# Patient Record
Sex: FEMALE | Race: BLACK OR AFRICAN AMERICAN | NOT HISPANIC OR LATINO
[De-identification: names, ages, dates, MRNs, and addresses within clinical notes are randomized per-mention and may not be internally consistent; named-entity substitution may affect disease eponyms.]

---

## 2021-03-17 ENCOUNTER — TRANSCRIPTION ENCOUNTER (OUTPATIENT)
Age: 61
End: 2021-03-17

## 2021-04-04 ENCOUNTER — EMERGENCY (EMERGENCY)
Facility: HOSPITAL | Age: 61
LOS: 0 days | Discharge: HOME | End: 2021-04-04
Attending: STUDENT IN AN ORGANIZED HEALTH CARE EDUCATION/TRAINING PROGRAM
Payer: COMMERCIAL

## 2021-04-04 VITALS
WEIGHT: 205.03 LBS | RESPIRATION RATE: 19 BRPM | DIASTOLIC BLOOD PRESSURE: 85 MMHG | HEIGHT: 67 IN | SYSTOLIC BLOOD PRESSURE: 193 MMHG | TEMPERATURE: 98 F | OXYGEN SATURATION: 100 % | HEART RATE: 83 BPM

## 2021-04-04 VITALS
RESPIRATION RATE: 18 BRPM | SYSTOLIC BLOOD PRESSURE: 147 MMHG | OXYGEN SATURATION: 99 % | DIASTOLIC BLOOD PRESSURE: 60 MMHG | HEART RATE: 75 BPM

## 2021-04-04 DIAGNOSIS — E78.5 HYPERLIPIDEMIA, UNSPECIFIED: ICD-10-CM

## 2021-04-04 DIAGNOSIS — I10 ESSENTIAL (PRIMARY) HYPERTENSION: ICD-10-CM

## 2021-04-04 DIAGNOSIS — R68.84 JAW PAIN: ICD-10-CM

## 2021-04-04 DIAGNOSIS — R94.31 ABNORMAL ELECTROCARDIOGRAM [ECG] [EKG]: ICD-10-CM

## 2021-04-04 DIAGNOSIS — Z91.041 RADIOGRAPHIC DYE ALLERGY STATUS: ICD-10-CM

## 2021-04-04 DIAGNOSIS — R51.9 HEADACHE, UNSPECIFIED: ICD-10-CM

## 2021-04-04 LAB
ALBUMIN SERPL ELPH-MCNC: 4.1 G/DL — SIGNIFICANT CHANGE UP (ref 3.3–5)
ALP SERPL-CCNC: 69 U/L — SIGNIFICANT CHANGE UP (ref 40–120)
ALT FLD-CCNC: 24 U/L — SIGNIFICANT CHANGE UP (ref 12–78)
ANION GAP SERPL CALC-SCNC: 6 MMOL/L — SIGNIFICANT CHANGE UP (ref 5–17)
APTT BLD: 32.4 SEC — SIGNIFICANT CHANGE UP (ref 27.5–35.5)
AST SERPL-CCNC: 21 U/L — SIGNIFICANT CHANGE UP (ref 15–37)
BASOPHILS # BLD AUTO: 0.01 K/UL — SIGNIFICANT CHANGE UP (ref 0–0.2)
BASOPHILS NFR BLD AUTO: 0.3 % — SIGNIFICANT CHANGE UP (ref 0–2)
BILIRUB SERPL-MCNC: 0.3 MG/DL — SIGNIFICANT CHANGE UP (ref 0.2–1.2)
BUN SERPL-MCNC: 12 MG/DL — SIGNIFICANT CHANGE UP (ref 7–23)
CALCIUM SERPL-MCNC: 9.5 MG/DL — SIGNIFICANT CHANGE UP (ref 8.5–10.1)
CHLORIDE SERPL-SCNC: 110 MMOL/L — HIGH (ref 96–108)
CO2 SERPL-SCNC: 27 MMOL/L — SIGNIFICANT CHANGE UP (ref 22–31)
CREAT SERPL-MCNC: 0.83 MG/DL — SIGNIFICANT CHANGE UP (ref 0.5–1.3)
EOSINOPHIL # BLD AUTO: 0.01 K/UL — SIGNIFICANT CHANGE UP (ref 0–0.5)
EOSINOPHIL NFR BLD AUTO: 0.3 % — SIGNIFICANT CHANGE UP (ref 0–6)
GLUCOSE SERPL-MCNC: 93 MG/DL — SIGNIFICANT CHANGE UP (ref 70–99)
HCT VFR BLD CALC: 39.9 % — SIGNIFICANT CHANGE UP (ref 34.5–45)
HGB BLD-MCNC: 13.6 G/DL — SIGNIFICANT CHANGE UP (ref 11.5–15.5)
IMM GRANULOCYTES NFR BLD AUTO: 0 % — SIGNIFICANT CHANGE UP (ref 0–1.5)
INR BLD: 0.93 RATIO — SIGNIFICANT CHANGE UP (ref 0.88–1.16)
LYMPHOCYTES # BLD AUTO: 1.98 K/UL — SIGNIFICANT CHANGE UP (ref 1–3.3)
LYMPHOCYTES # BLD AUTO: 50.8 % — HIGH (ref 13–44)
MCHC RBC-ENTMCNC: 27.9 PG — SIGNIFICANT CHANGE UP (ref 27–34)
MCHC RBC-ENTMCNC: 34.1 GM/DL — SIGNIFICANT CHANGE UP (ref 32–36)
MCV RBC AUTO: 81.8 FL — SIGNIFICANT CHANGE UP (ref 80–100)
MONOCYTES # BLD AUTO: 0.26 K/UL — SIGNIFICANT CHANGE UP (ref 0–0.9)
MONOCYTES NFR BLD AUTO: 6.7 % — SIGNIFICANT CHANGE UP (ref 2–14)
NEUTROPHILS # BLD AUTO: 1.64 K/UL — LOW (ref 1.8–7.4)
NEUTROPHILS NFR BLD AUTO: 41.9 % — LOW (ref 43–77)
NRBC # BLD: 0 /100 WBCS — SIGNIFICANT CHANGE UP (ref 0–0)
NT-PROBNP SERPL-SCNC: 65 PG/ML — SIGNIFICANT CHANGE UP (ref 0–125)
PLATELET # BLD AUTO: 158 K/UL — SIGNIFICANT CHANGE UP (ref 150–400)
POTASSIUM SERPL-MCNC: 4.5 MMOL/L — SIGNIFICANT CHANGE UP (ref 3.5–5.3)
POTASSIUM SERPL-SCNC: 4.5 MMOL/L — SIGNIFICANT CHANGE UP (ref 3.5–5.3)
PROT SERPL-MCNC: 8 GM/DL — SIGNIFICANT CHANGE UP (ref 6–8.3)
PROTHROM AB SERPL-ACNC: 10.8 SEC — SIGNIFICANT CHANGE UP (ref 10.6–13.6)
RBC # BLD: 4.88 M/UL — SIGNIFICANT CHANGE UP (ref 3.8–5.2)
RBC # FLD: 15.1 % — HIGH (ref 10.3–14.5)
SODIUM SERPL-SCNC: 143 MMOL/L — SIGNIFICANT CHANGE UP (ref 135–145)
TROPONIN I SERPL-MCNC: <.015 NG/ML — SIGNIFICANT CHANGE UP (ref 0.01–0.04)
TROPONIN I SERPL-MCNC: <.015 NG/ML — SIGNIFICANT CHANGE UP (ref 0.01–0.04)
WBC # BLD: 3.9 K/UL — SIGNIFICANT CHANGE UP (ref 3.8–10.5)
WBC # FLD AUTO: 3.9 K/UL — SIGNIFICANT CHANGE UP (ref 3.8–10.5)

## 2021-04-04 PROCEDURE — 70450 CT HEAD/BRAIN W/O DYE: CPT | Mod: 26,MC

## 2021-04-04 PROCEDURE — 99285 EMERGENCY DEPT VISIT HI MDM: CPT

## 2021-04-04 PROCEDURE — 71045 X-RAY EXAM CHEST 1 VIEW: CPT | Mod: 26

## 2021-04-04 PROCEDURE — 93010 ELECTROCARDIOGRAM REPORT: CPT

## 2021-04-04 RX ORDER — ASPIRIN/CALCIUM CARB/MAGNESIUM 324 MG
325 TABLET ORAL ONCE
Refills: 0 | Status: COMPLETED | OUTPATIENT
Start: 2021-04-04 | End: 2021-04-04

## 2021-04-04 RX ORDER — ACETAMINOPHEN 500 MG
650 TABLET ORAL ONCE
Refills: 0 | Status: COMPLETED | OUTPATIENT
Start: 2021-04-04 | End: 2021-04-04

## 2021-04-04 RX ORDER — SODIUM CHLORIDE 9 MG/ML
1000 INJECTION INTRAMUSCULAR; INTRAVENOUS; SUBCUTANEOUS ONCE
Refills: 0 | Status: COMPLETED | OUTPATIENT
Start: 2021-04-04 | End: 2021-04-04

## 2021-04-04 RX ORDER — HYDRALAZINE HCL 50 MG
10 TABLET ORAL ONCE
Refills: 0 | Status: COMPLETED | OUTPATIENT
Start: 2021-04-04 | End: 2021-04-04

## 2021-04-04 RX ADMIN — Medication 10 MILLIGRAM(S): at 13:24

## 2021-04-04 RX ADMIN — SODIUM CHLORIDE 1000 MILLILITER(S): 9 INJECTION INTRAMUSCULAR; INTRAVENOUS; SUBCUTANEOUS at 14:37

## 2021-04-04 RX ADMIN — SODIUM CHLORIDE 1000 MILLILITER(S): 9 INJECTION INTRAMUSCULAR; INTRAVENOUS; SUBCUTANEOUS at 15:21

## 2021-04-04 RX ADMIN — Medication 325 MILLIGRAM(S): at 13:10

## 2021-04-04 NOTE — ED ADULT NURSE NOTE - OBJECTIVE STATEMENT
Patient is alert and oriented x4. Patient complains of left sided headache that radiates to left jaw 6/10 that started yesterday PM. Patient has a history of high cholesterol and hypertension. States she took her Amlodipine 5 mg this AM.

## 2021-04-04 NOTE — ED PROVIDER NOTE - PROGRESS NOTE DETAILS
BP improved to 140/90 s/p hydralazine. Resting comfortably. Pt reports continued headache. Tylenol, CT brain ordered. BP decreased to 80/60. Pt reports dizziness. Will give IVF. Rpt /60. Dizziness, headache are resolved. Resting comfortably. Delta trop negative. CT brain WNL.

## 2021-04-04 NOTE — ED PROVIDER NOTE - CLINICAL SUMMARY MEDICAL DECISION MAKING FREE TEXT BOX
59yo F w/ PMH HTN, HLD pw 2 days L jaw pain a/w elevated blood pressures. AFVSS other than elevated BP (200/100) on ED arrival. Plan: Treat BP, headache, obtain ECG, trop x2, CBC, CMP, CXR, coags, CT brain. Re-eval. W/u w/o significant abnormalities. HEART score 3 low risk. On re-eval, symptoms resolved. Stable for d/c. Instructed for close outpatient f/u w/ Cardio, PCP. Return signs and symptoms d/w pt. She understands and agrees w/ this plan.

## 2021-04-04 NOTE — ED PROVIDER NOTE - OBJECTIVE STATEMENT
61yo F w/ PMH HTN, HLD pw elevated BPs (190/105), a/w L jaw pain x 2 days. Pt reports initially L facial pain w/ straining BM, but pain persisted, today moved into L jaw / anterior lateral neck. Concerned and so comes to ED. Denies hx MI, no outpatient Cardio. PCP (Dr Hanna) recently changed BP med to amlodipine 1.5wk ago. ROS otherwise neg: Denies F/C, CP, SOB, cough, abd pain, back pain, N/V/D, UTI sx.     PMH as above, PSH crestor, amlodipine, NKDA, PSH  x 2 59yo F w/ PMH HTN, HLD pw elevated BPs (190/105), a/w L jaw pain x 2 days. Pt reports initially L facial pain w/ straining BM, but pain persisted, today moved into L jaw / anterior lateral neck. Concerned and so comes to ED. Denies hx MI, no outpatient Cardio. PCP (Dr Godwin) recently changed BP med to amlodipine 1.5wk ago. ROS otherwise neg: Denies F/C, CP, SOB, cough, abd pain, back pain, N/V/D, UTI sx.     PMH as above, PSH crestor, amlodipine, NKDA, PSH  x 2

## 2021-04-04 NOTE — ED ADULT NURSE NOTE - NSIMPLEMENTINTERV_GEN_ALL_ED
Implemented All Universal Safety Interventions:  [place] to call system. Call bell, personal items and telephone within reach. Instruct patient to call for assistance. Room bathroom lighting operational. Non-slip footwear when patient is off stretcher. Physically safe environment: no spills, clutter or unnecessary equipment. Stretcher in lowest position, wheels locked, appropriate side rails in place.

## 2021-04-04 NOTE — ED PROVIDER NOTE - CARE PROVIDER_API CALL
Rohan Huerta)  Cardiology; Cardiovascular Disease; Nuclear Cardiology  [address] [place] [address]  Phone: [phone]  Fax: [phone]  Follow Up Time: 7-10 Days

## 2021-04-04 NOTE — ED PROVIDER NOTE - PATIENT PORTAL LINK FT
You can access the FollowMyHealth Patient Portal offered by [facility] by registering at the following website: [email or web address]. By joining "[employer]’s FollowMyHealth portal, you will also be able to view your health information using other applications (apps) compatible with our system.

## 2021-04-05 PROBLEM — E78.00 PURE HYPERCHOLESTEROLEMIA, UNSPECIFIED: Chronic | Status: ACTIVE | Noted: 2021-04-04

## 2021-04-05 PROBLEM — I10 ESSENTIAL (PRIMARY) HYPERTENSION: Chronic | Status: ACTIVE | Noted: 2021-04-04

## 2021-04-16 ENCOUNTER — NON-APPOINTMENT (OUTPATIENT)
Age: 61
End: 2021-04-16

## 2021-04-16 ENCOUNTER — APPOINTMENT (OUTPATIENT)
Dept: CARDIOLOGY | Facility: CLINIC | Age: 61
End: 2021-04-16
Payer: COMMERCIAL

## 2021-04-16 VITALS
DIASTOLIC BLOOD PRESSURE: 80 MMHG | TEMPERATURE: 97.5 F | SYSTOLIC BLOOD PRESSURE: 170 MMHG | WEIGHT: 204 LBS | HEIGHT: 67 IN | BODY MASS INDEX: 32.02 KG/M2 | OXYGEN SATURATION: 99 % | HEART RATE: 84 BPM

## 2021-04-16 VITALS — DIASTOLIC BLOOD PRESSURE: 80 MMHG | SYSTOLIC BLOOD PRESSURE: 162 MMHG

## 2021-04-16 DIAGNOSIS — R94.31 ABNORMAL ELECTROCARDIOGRAM [ECG] [EKG]: ICD-10-CM

## 2021-04-16 PROCEDURE — 99072 ADDL SUPL MATRL&STAF TM PHE: CPT

## 2021-04-16 PROCEDURE — 99204 OFFICE O/P NEW MOD 45 MIN: CPT

## 2021-04-16 PROCEDURE — 93000 ELECTROCARDIOGRAM COMPLETE: CPT

## 2021-04-16 RX ORDER — AMLODIPINE BESYLATE 10 MG/1
10 TABLET ORAL
Qty: 90 | Refills: 2 | Status: DISCONTINUED | COMMUNITY
End: 2021-04-16

## 2021-04-16 RX ORDER — AMLODIPINE AND OLMESARTAN MEDOXOMIL 10; 40 MG/1; MG/1
10-40 TABLET ORAL DAILY
Qty: 90 | Refills: 3 | Status: ACTIVE | COMMUNITY
Start: 2021-04-16 | End: 1900-01-01

## 2021-04-16 RX ORDER — ROSUVASTATIN CALCIUM 10 MG/1
10 TABLET, FILM COATED ORAL
Qty: 90 | Refills: 2 | Status: ACTIVE | COMMUNITY

## 2021-04-16 NOTE — PHYSICAL EXAM
[General Appearance - Well Developed] : well developed [Normal Appearance] : normal appearance [Well Groomed] : well groomed [General Appearance - Well Nourished] : well nourished [No Deformities] : no deformities [General Appearance - In No Acute Distress] : no acute distress [Normal Conjunctiva] : the conjunctiva exhibited no abnormalities [Eyelids - No Xanthelasma] : the eyelids demonstrated no xanthelasmas [Normal Oral Mucosa] : normal oral mucosa [No Oral Pallor] : no oral pallor [No Oral Cyanosis] : no oral cyanosis [Normal Jugular Venous A Waves Present] : normal jugular venous A waves present [Normal Jugular Venous V Waves Present] : normal jugular venous V waves present [No Jugular Venous Das A Waves] : no jugular venous das A waves [Heart Rate And Rhythm] : heart rate and rhythm were normal [Heart Sounds] : normal S1 and S2 [Murmurs] : no murmurs present [Respiration, Rhythm And Depth] : normal respiratory rhythm and effort [Exaggerated Use Of Accessory Muscles For Inspiration] : no accessory muscle use [Auscultation Breath Sounds / Voice Sounds] : lungs were clear to auscultation bilaterally [Abdomen Soft] : soft [Abdomen Tenderness] : non-tender [Abdomen Mass (___ Cm)] : no abdominal mass palpated [Abnormal Walk] : normal gait [Gait - Sufficient For Exercise Testing] : the gait was sufficient for exercise testing [Nail Clubbing] : no clubbing of the fingernails [Cyanosis, Localized] : no localized cyanosis [Petechial Hemorrhages (___cm)] : no petechial hemorrhages [Skin Color & Pigmentation] : normal skin color and pigmentation [] : no rash [No Venous Stasis] : no venous stasis [Skin Lesions] : no skin lesions [No Skin Ulcers] : no skin ulcer [No Xanthoma] : no  xanthoma was observed [Oriented To Time, Place, And Person] : oriented to person, place, and time [Affect] : the affect was normal [Mood] : the mood was normal [No Anxiety] : not feeling anxious

## 2021-04-16 NOTE — HISTORY OF PRESENT ILLNESS
[FreeTextEntry1] : 60-year-old female who was seen at [facility] emergency room on 4/4/2021 for complaints of severe headache radiating to left jaw.  Patient has longstanding history of hypertension and hyperlipidemia.  She had been on Surekha until several weeks ago when she was noted to have a cough and the Azo was discontinued (she had been started on Toprol but that too was discontinued because of persistent blood pressure elevations), currently on amlodipine 10 mg.  She was treated for her headache in the emergency room but was noted to have severely elevated blood pressures.  She was given hydralazine and pain medications for headache; her blood pressure actually bottomed out and then she was treated with IV fluid and ultimately when the headache resolved the blood pressure is improved.  She was discharged and told to follow-up with cardiology because of EKG abnormalities that were noted (including inferior and lateral wall flipped T waves).  She denies any prior history of coronary disease, no history of chest pain, diaphoresis, palpitations or syncopal episodes.\par \par When I reviewed her previous EKGs done at her primary care physician's office, they were noted to be normal.  EKG ordered today for follow-up also shows fairly normal EKG with some LVH and first-degree AV block.

## 2021-04-16 NOTE — DISCUSSION/SUMMARY
[___ Week(s)] : [unfilled] week(s) [FreeTextEntry1] : 60-year-old female with somewhat poorly controlled hypertension.  Referred for abnormal EKG.  Patient will be scheduled for risk stratification with exercise nuclear stress testing.\par \par Meanwhile, advised resumption of her Surekha.  Doubt that olmesartan would be the cause of her cough but if her cough recurs we will seek out alternatives.  Patient asked to keep a log of her blood pressures at home when she is feeling well.  We will see her again in 4 to 6 weeks for reevaluation.  She is scheduled to be seen by her PCP next week who will also follow-up with blood pressure measurement.  She states that she had an echocardiogram, will request copies.\par \par Some of her persistent elevated blood pressures may be due to sleep apnea.  Patient does have significant insomnia which has not responded to melatonin or other conservative management in the past.  Consider referral for sleep study.  Also, significant stressors at work may be playing into her current elevated blood pressure; patient sought to seek out help if available at her occupation.

## 2021-04-26 ENCOUNTER — APPOINTMENT (OUTPATIENT)
Dept: CARDIOLOGY | Facility: CLINIC | Age: 61
End: 2021-04-26
Payer: COMMERCIAL

## 2021-04-26 PROCEDURE — 99072 ADDL SUPL MATRL&STAF TM PHE: CPT

## 2021-04-26 PROCEDURE — A9500: CPT

## 2021-04-26 PROCEDURE — 78452 HT MUSCLE IMAGE SPECT MULT: CPT

## 2021-04-26 PROCEDURE — 93015 CV STRESS TEST SUPVJ I&R: CPT

## 2021-06-10 ENCOUNTER — APPOINTMENT (OUTPATIENT)
Dept: CARDIOLOGY | Facility: CLINIC | Age: 61
End: 2021-06-10
Payer: COMMERCIAL

## 2021-06-10 VITALS
BODY MASS INDEX: 31.39 KG/M2 | HEIGHT: 67 IN | SYSTOLIC BLOOD PRESSURE: 150 MMHG | OXYGEN SATURATION: 98 % | HEART RATE: 80 BPM | TEMPERATURE: 97 F | WEIGHT: 200 LBS | DIASTOLIC BLOOD PRESSURE: 84 MMHG

## 2021-06-10 DIAGNOSIS — E78.5 HYPERLIPIDEMIA, UNSPECIFIED: ICD-10-CM

## 2021-06-10 DIAGNOSIS — I10 ESSENTIAL (PRIMARY) HYPERTENSION: ICD-10-CM

## 2021-06-10 PROCEDURE — 93000 ELECTROCARDIOGRAM COMPLETE: CPT

## 2021-06-10 PROCEDURE — 99072 ADDL SUPL MATRL&STAF TM PHE: CPT

## 2021-06-10 PROCEDURE — 99213 OFFICE O/P EST LOW 20 MIN: CPT

## 2021-06-10 PROCEDURE — 93306 TTE W/DOPPLER COMPLETE: CPT

## 2021-06-10 NOTE — HISTORY OF PRESENT ILLNESS
[FreeTextEntry1] : 60-year-old female who was seen at [facility] emergency room on 4/4/2021 for complaints of severe headache radiating to left jaw.  Patient has longstanding history of hypertension and hyperlipidemia.  She had been on Surekha until several weeks ago when she was noted to have a cough and the Azo was discontinued (she had been started on Toprol but that too was discontinued because of persistent blood pressure elevations), currently on amlodipine 10 mg.  She was treated for her headache in the emergency room but was noted to have severely elevated blood pressures.  She was given hydralazine and pain medications for headache; her blood pressure actually bottomed out and then she was treated with IV fluid and ultimately when the headache resolved the blood pressure is improved.  She was discharged and told to follow-up with cardiology because of EKG abnormalities that were noted (including inferior and lateral wall flipped T waves).  She denies any prior history of coronary disease, no history of chest pain, diaphoresis, palpitations or syncopal episodes.\par \par Stress test done was unremarkable.

## 2021-06-10 NOTE — DISCUSSION/SUMMARY
[___ Week(s)] : in [unfilled] week(s) [FreeTextEntry1] : 60-year-old female with somewhat poorly controlled hypertension.  \par \par Patient "log of her self measured blood pressures which, interestingly enough, show absolutely normal blood pressures on the weekends and elevated blood pressures at work.  Patient agrees that there is probably a link between significant stressors at work and her blood pressures.  Will obtain echocardiogram to assess for end-organ damage but advised patient seek stress reducing alternatives at her current employment (job counseling?Change of jobs?)\par \par Suggest referral for sleep apnea assessment.  Patient does have significant insomnia which has not responded to melatonin or other conservative management in the past.

## 2021-06-10 NOTE — CARDIOLOGY SUMMARY
[de-identified] : 4/16/21, Sinus  Rhythm  -First degree A-V block  Voltage criteria for LVH  (R(I)+S(III) exceeds 2.50 mV)  -Voltage criteria w/o ST/T abnormality may be normal.   -consider old anterior infarct.  [de-identified] : 4/26/21, Normal exercise SPECT

## 2021-06-29 PROBLEM — E78.5 HYPERLIPIDEMIA, MILD: Status: ACTIVE | Noted: 2021-04-16

## 2021-06-29 PROBLEM — I10 BENIGN SYSTOLIC HYPERTENSION: Status: ACTIVE | Noted: 2021-04-16

## 2021-10-05 ENCOUNTER — APPOINTMENT (OUTPATIENT)
Dept: CARDIOLOGY | Facility: CLINIC | Age: 61
End: 2021-10-05

## 2022-02-09 ENCOUNTER — INPATIENT (INPATIENT)
Facility: HOSPITAL | Age: 62
LOS: 0 days | Discharge: HOME | End: 2022-02-10
Attending: STUDENT IN AN ORGANIZED HEALTH CARE EDUCATION/TRAINING PROGRAM | Admitting: STUDENT IN AN ORGANIZED HEALTH CARE EDUCATION/TRAINING PROGRAM
Payer: COMMERCIAL

## 2022-02-09 VITALS
HEIGHT: 67 IN | TEMPERATURE: 98 F | HEART RATE: 82 BPM | SYSTOLIC BLOOD PRESSURE: 140 MMHG | RESPIRATION RATE: 19 BRPM | WEIGHT: 199.96 LBS | OXYGEN SATURATION: 100 % | DIASTOLIC BLOOD PRESSURE: 77 MMHG

## 2022-02-09 LAB
ALBUMIN SERPL ELPH-MCNC: 3.1 G/DL — LOW (ref 3.3–5)
ALP SERPL-CCNC: 56 U/L — SIGNIFICANT CHANGE UP (ref 40–120)
ALT FLD-CCNC: 70 U/L — SIGNIFICANT CHANGE UP (ref 12–78)
ANION GAP SERPL CALC-SCNC: 7 MMOL/L — SIGNIFICANT CHANGE UP (ref 5–17)
APPEARANCE UR: CLEAR — SIGNIFICANT CHANGE UP
APTT BLD: 27.7 SEC — SIGNIFICANT CHANGE UP (ref 27.5–35.5)
AST SERPL-CCNC: 50 U/L — HIGH (ref 15–37)
BACTERIA # UR AUTO: ABNORMAL
BASOPHILS # BLD AUTO: 0.01 K/UL — SIGNIFICANT CHANGE UP (ref 0–0.2)
BASOPHILS NFR BLD AUTO: 0.2 % — SIGNIFICANT CHANGE UP (ref 0–2)
BILIRUB SERPL-MCNC: 0.5 MG/DL — SIGNIFICANT CHANGE UP (ref 0.2–1.2)
BILIRUB UR-MCNC: NEGATIVE — SIGNIFICANT CHANGE UP
BUN SERPL-MCNC: 17 MG/DL — SIGNIFICANT CHANGE UP (ref 7–23)
CALCIUM SERPL-MCNC: 9.4 MG/DL — SIGNIFICANT CHANGE UP (ref 8.5–10.1)
CHLORIDE SERPL-SCNC: 106 MMOL/L — SIGNIFICANT CHANGE UP (ref 96–108)
CO2 SERPL-SCNC: 26 MMOL/L — SIGNIFICANT CHANGE UP (ref 22–31)
COLOR SPEC: YELLOW — SIGNIFICANT CHANGE UP
CREAT SERPL-MCNC: 1.04 MG/DL — SIGNIFICANT CHANGE UP (ref 0.5–1.3)
D DIMER BLD IA.RAPID-MCNC: 363 NG/ML DDU — HIGH
DIFF PNL FLD: NEGATIVE — SIGNIFICANT CHANGE UP
EOSINOPHIL # BLD AUTO: 0 K/UL — SIGNIFICANT CHANGE UP (ref 0–0.5)
EOSINOPHIL NFR BLD AUTO: 0 % — SIGNIFICANT CHANGE UP (ref 0–6)
EPI CELLS # UR: SIGNIFICANT CHANGE UP
FLUAV AG NPH QL: SIGNIFICANT CHANGE UP
FLUBV AG NPH QL: SIGNIFICANT CHANGE UP
GLUCOSE SERPL-MCNC: 94 MG/DL — SIGNIFICANT CHANGE UP (ref 70–99)
GLUCOSE UR QL: NEGATIVE MG/DL — SIGNIFICANT CHANGE UP
HCT VFR BLD CALC: 34.2 % — LOW (ref 34.5–45)
HGB BLD-MCNC: 11.3 G/DL — LOW (ref 11.5–15.5)
IMM GRANULOCYTES NFR BLD AUTO: 1.1 % — SIGNIFICANT CHANGE UP (ref 0–1.5)
INR BLD: 0.97 RATIO — SIGNIFICANT CHANGE UP (ref 0.88–1.16)
KETONES UR-MCNC: NEGATIVE — SIGNIFICANT CHANGE UP
LEUKOCYTE ESTERASE UR-ACNC: NEGATIVE — SIGNIFICANT CHANGE UP
LYMPHOCYTES # BLD AUTO: 1.9 K/UL — SIGNIFICANT CHANGE UP (ref 1–3.3)
LYMPHOCYTES # BLD AUTO: 40.1 % — SIGNIFICANT CHANGE UP (ref 13–44)
MCHC RBC-ENTMCNC: 27.2 PG — SIGNIFICANT CHANGE UP (ref 27–34)
MCHC RBC-ENTMCNC: 33 G/DL — SIGNIFICANT CHANGE UP (ref 32–36)
MCV RBC AUTO: 82.4 FL — SIGNIFICANT CHANGE UP (ref 80–100)
MONOCYTES # BLD AUTO: 0.54 K/UL — SIGNIFICANT CHANGE UP (ref 0–0.9)
MONOCYTES NFR BLD AUTO: 11.4 % — SIGNIFICANT CHANGE UP (ref 2–14)
NEUTROPHILS # BLD AUTO: 2.24 K/UL — SIGNIFICANT CHANGE UP (ref 1.8–7.4)
NEUTROPHILS NFR BLD AUTO: 47.2 % — SIGNIFICANT CHANGE UP (ref 43–77)
NITRITE UR-MCNC: NEGATIVE — SIGNIFICANT CHANGE UP
NRBC # BLD: 0 /100 WBCS — SIGNIFICANT CHANGE UP (ref 0–0)
NT-PROBNP SERPL-SCNC: 103 PG/ML — SIGNIFICANT CHANGE UP (ref 0–125)
PH UR: 5 — SIGNIFICANT CHANGE UP (ref 5–8)
PLATELET # BLD AUTO: 135 K/UL — LOW (ref 150–400)
POTASSIUM SERPL-MCNC: 4.1 MMOL/L — SIGNIFICANT CHANGE UP (ref 3.5–5.3)
POTASSIUM SERPL-SCNC: 4.1 MMOL/L — SIGNIFICANT CHANGE UP (ref 3.5–5.3)
PROT SERPL-MCNC: 7.6 GM/DL — SIGNIFICANT CHANGE UP (ref 6–8.3)
PROT UR-MCNC: 15 MG/DL
PROTHROM AB SERPL-ACNC: 11.3 SEC — SIGNIFICANT CHANGE UP (ref 10.6–13.6)
RBC # BLD: 4.15 M/UL — SIGNIFICANT CHANGE UP (ref 3.8–5.2)
RBC # FLD: 15 % — HIGH (ref 10.3–14.5)
SARS-COV-2 RNA SPEC QL NAA+PROBE: SIGNIFICANT CHANGE UP
SODIUM SERPL-SCNC: 139 MMOL/L — SIGNIFICANT CHANGE UP (ref 135–145)
SP GR SPEC: 1.01 — SIGNIFICANT CHANGE UP (ref 1.01–1.02)
TROPONIN I, HIGH SENSITIVITY RESULT: 6.6 NG/L — SIGNIFICANT CHANGE UP
UROBILINOGEN FLD QL: NEGATIVE MG/DL — SIGNIFICANT CHANGE UP
WBC # BLD: 4.74 K/UL — SIGNIFICANT CHANGE UP (ref 3.8–10.5)
WBC # FLD AUTO: 4.74 K/UL — SIGNIFICANT CHANGE UP (ref 3.8–10.5)

## 2022-02-09 PROCEDURE — 73600 X-RAY EXAM OF ANKLE: CPT | Mod: 26,LT

## 2022-02-09 PROCEDURE — 73564 X-RAY EXAM KNEE 4 OR MORE: CPT | Mod: 26,LT

## 2022-02-09 PROCEDURE — 71045 X-RAY EXAM CHEST 1 VIEW: CPT | Mod: 26

## 2022-02-09 PROCEDURE — 93971 EXTREMITY STUDY: CPT | Mod: 26

## 2022-02-09 PROCEDURE — 73590 X-RAY EXAM OF LOWER LEG: CPT | Mod: 26,LT

## 2022-02-09 PROCEDURE — 99223 1ST HOSP IP/OBS HIGH 75: CPT

## 2022-02-09 PROCEDURE — 99285 EMERGENCY DEPT VISIT HI MDM: CPT

## 2022-02-09 RX ORDER — AMLODIPINE BESYLATE 2.5 MG/1
1 TABLET ORAL
Qty: 0 | Refills: 0 | DISCHARGE

## 2022-02-09 RX ORDER — ATORVASTATIN CALCIUM 80 MG/1
40 TABLET, FILM COATED ORAL AT BEDTIME
Refills: 0 | Status: DISCONTINUED | OUTPATIENT
Start: 2022-02-09 | End: 2022-02-10

## 2022-02-09 RX ORDER — KETOROLAC TROMETHAMINE 30 MG/ML
30 SYRINGE (ML) INJECTION ONCE
Refills: 0 | Status: DISCONTINUED | OUTPATIENT
Start: 2022-02-09 | End: 2022-02-09

## 2022-02-09 RX ORDER — AMLODIPINE BESYLATE 2.5 MG/1
10 TABLET ORAL DAILY
Refills: 0 | Status: DISCONTINUED | OUTPATIENT
Start: 2022-02-09 | End: 2022-02-10

## 2022-02-09 RX ORDER — LISINOPRIL 2.5 MG/1
40 TABLET ORAL DAILY
Refills: 0 | Status: DISCONTINUED | OUTPATIENT
Start: 2022-02-09 | End: 2022-02-10

## 2022-02-09 RX ADMIN — Medication 30 MILLIGRAM(S): at 12:03

## 2022-02-09 RX ADMIN — Medication 30 MILLIGRAM(S): at 14:06

## 2022-02-09 RX ADMIN — ATORVASTATIN CALCIUM 40 MILLIGRAM(S): 80 TABLET, FILM COATED ORAL at 22:09

## 2022-02-09 RX ADMIN — Medication 110 MILLIGRAM(S): at 19:00

## 2022-02-09 NOTE — ED ADULT NURSE REASSESSMENT NOTE - NS ED NURSE REASSESS COMMENT FT1
pt angered upset demanding to speak with supervisor. zach to speak with 2E nurse manager. as per nurse on 2E busy on unit, nurse is unavailable d/t medication pass.  pt reassured this has been escalated. yelling at this nurse at this time. pt angered upset demanding to speak with supervisor. Baldoia to speak with 2E nurse manager. as per nurse on 2E busy on unit, nurse is unavailable d/t medication pass.  pt reassured this has been escalated. yelling at this nurse at this time.

## 2022-02-09 NOTE — ED ADULT NURSE NOTE - OBJECTIVE STATEMENT
received er bed iso 21 c/o ongoing chronic pain b/l knees l knee worse with swelling under ortho care for treatment of arthritis c/o l ankle pain with redness and warmth at site since sunday seen at urgent care for fever and l ankle pain abx rx'd symptoms persist

## 2022-02-09 NOTE — ED PROVIDER NOTE - NS ED ROS FT
Constitutional: (+) Fever, (-) Anorexia, (-) Generalized Malaise  Eyes: (-)Discharge, (-) Irritation,  (-) Visual changes  EARS: (-) Ear Pain, (-) Apparent hearing changes  NOSE: (-) Congestion, (-) Bloody nose  MOUTH/THROAT: (-) Vocal Changes, (-) Drooling, (-) Sore throat  NECK: (-) Lumps, (-) Stiffness, (-) Pain  CV: (-) Chest Pain, (-) Palpitations, (-) Edema   RESP:  (-) Cough, (+) SOB, (-) YOUNGER,  (-) Wheezing  GI: (-) Nausea, (-) Vomiting, (-) Abdominal Pain, (-) Diarrhea, (-) Constipation, (-) Bloody stools  : (-) Dysuria, (-) Frequency, (-) Hematuria, (-) Incontinence  MSK: (+) Joint Pain, (-) Back Pain, (-) Deformities  SKIN: (-) Wounds, (-) Color change, (-)Rash, (-) Swelling  NEURO:(-) Headache, (+) Dizziness, (-) Numbness/Tingling,  (-)LOC

## 2022-02-09 NOTE — ED PROVIDER NOTE - PHYSICAL EXAMINATION
PE:   GEN: Awake, alert, interactive, NAD, non-toxic appearing.   HEAD AND NECK: NC/AT. Airway patent. Neck supple.   EYES: Clear b/l. PERRL  CARDIAC: RRR. S1, S2. No evident pedal edema.    RESP: Normal respiratory effort with no use of accessory muscles or retractions. Clear throughout on auscultation.  ABD: soft, non-distended, non-tender. No rebound, no guarding.   NEURO: AOx3, CN II-XII grossly intact, no focal deficits.   MSK: Swollen L knee, hot to touch, pain with active and passive ROM, able to range passively with little stiffness. L ankle swollen and red on anterior aspect, ttp over redness but not the rest of the joint.   SKIN: Warm, dry, intact normal color

## 2022-02-09 NOTE — H&P ADULT - ASSESSMENT
62 yo female PMH HTN, HLD, arthritis presents with fever/chills associated with knee pain L>R and L ankle pain x 4 days. Also states she has been feeling dizzy and sob with exertion since onset. Denies chest pain, loc, known sick contacts, no prior blood clots, no history of covid. As per patient, had intraarticular injection, last May was last injection for arthritis.     Plan: Admit to medicine for SOB eval. Leg dopplers negative, has IV dye allergy. VQ scan for AM ordered. BNP and TTE ordered for   am to eval SOB. BNP level for AM.     Will add IV Doxycycline for left ankle cellulitis.      Continue home meds of Norvasc, Zestril and Crestor.  If above eval WNL, likely discharge home in AM.   60 yo female PMH HTN, HLD, arthritis presents with fever/chills associated with knee pain L>R and L ankle pain x 4 days. Also states she has been feeling dizzy and sob with exertion since onset. Denies chest pain, loc, known sick contacts, no prior blood clots, no history of covid. As per patient, had intraarticular injection, last May was last injection for arthritis.     Plan: Admit to medicine for SOB eval. Leg dopplers negative, has IV dye allergy. VQ scan for AM ordered. BNP and TTE ordered for   am to eval SOB. BNP level for AM.  D-dimer mild elevation at 362.     Will add IV Doxycycline for left ankle cellulitis.      Continue home meds of Norvasc, Zestril and Crestor.  If above eval WNL, likely discharge home in AM.

## 2022-02-09 NOTE — ED PROVIDER NOTE - CLINICAL SUMMARY MEDICAL DECISION MAKING FREE TEXT BOX
62 yo female with pmh of with pmh of htn, hld and arthritis presents with fever/chills associated with knee pain L>R and L ankle pain x 4 days with new onset sob with exertion. Vaccinated x 2. Started cefdinir course as per urgi. Afebrile with normal vital signs here. Exam concerning for cellulitis but given history will get venous duplex, CE, CXR, and blood work. Consider admission pending work up

## 2022-02-09 NOTE — ED PROVIDER NOTE - OBJECTIVE STATEMENT
62 yo female with pmh of with pmh of htn, hld and arthritis 62 yo female with pmh of with pmh of htn, hld and arthritis presents with fever/chills associated with knee pain L>R and L ankle pain x 4 days. Also states she has been feeling dizzy and sob with exertion since onset. Denies chest pain, loc, known sick contacts, hx of ca/clots, ocp use, hx of covid, and other associated sx. As per patient, has intraarticular injection - last may was last injection for his arthritis.     As per niece, patient has been "extremely" short of breath and "gasping for air" with little exertion. Also this morning patient was confused and "out of it" for almost an hour until she came back to [facility]. Went ot urgi that couldn't find source for fever and sent with cefdinir in case of UTI but pt does not have urinary symptoms.

## 2022-02-09 NOTE — H&P ADULT - NSHPPHYSICALEXAM_GEN_ALL_CORE
PHYSICAL EXAMINATION:  Vital Signs Last 24 Hrs  T(C): 36.6 (09 Feb 2022 17:31), Max: 36.7 (09 Feb 2022 10:15)  T(F): 97.8 (09 Feb 2022 17:31), Max: 98.1 (09 Feb 2022 10:15)  HR: 77 (09 Feb 2022 17:31) (77 - 82)  BP: 157/74 (09 Feb 2022 17:31) (140/77 - 157/74)  BP(mean): --  RR: 20 (09 Feb 2022 17:31) (19 - 20)  SpO2: 97% (09 Feb 2022 17:31) (97% - 100%)  CAPILLARY BLOOD GLUCOSE          GENERAL: NAD, well-groomed, well-developed  HEAD:  atraumatic, normocephalic  EYES: sclera anicteric  ENMT: mucous membranes moist  NECK: supple, No JVD  CHEST/LUNG: clear to auscultation bilaterally; no rales, rhonchi, or wheezing b/l  HEART: normal S1, S2  ABDOMEN: BS+, soft, ND, NT   EXTREMITIES:  pulses palpable; no clubbing, cyanosis, or edema b/l LEs  NEURO: awake, alert, interactive; moves all extremities  SKIN: no rashes or lesions PHYSICAL EXAMINATION:  Vital Signs Last 24 Hrs  T(C): 36.6 (09 Feb 2022 17:31), Max: 36.7 (09 Feb 2022 10:15)  T(F): 97.8 (09 Feb 2022 17:31), Max: 98.1 (09 Feb 2022 10:15)  HR: 77 (09 Feb 2022 17:31) (77 - 82)  BP: 157/74 (09 Feb 2022 17:31) (140/77 - 157/74)  BP(mean): --  RR: 20 (09 Feb 2022 17:31) (19 - 20)  SpO2: 97% (09 Feb 2022 17:31) (97% - 100%)  CAPILLARY BLOOD GLUCOSE          GENERAL: NAD, seen in ER, comfortable, no CP, fevers or SOB   mild left ankle cellulitis.   HEAD:  atraumatic, normocephalic  EYES: sclera anicteric  ENMT: mucous membranes moist  NECK: supple, No JVD  CHEST/LUNG: clear to auscultation bilaterally; no rales, rhonchi, or wheezing b/l  HEART: normal S1, S2  ABDOMEN: BS+, soft, ND, NT   EXTREMITIES:  pulses palpable; no clubbing, cyanosis, or edema b/l LEs  NEURO: awake, alert, interactive; moves all extremities  SKIN: no rashes or lesions

## 2022-02-09 NOTE — H&P ADULT - NSHPLABSRESULTS_GEN_ALL_CORE
LABS:                        11.3   4.74  )-----------( 135      ( 2022 12:37 )             34.2         139  |  106  |  17  ----------------------------<  94  4.1   |  26  |  1.04    Ca    9.4      2022 12:37    TPro  7.6  /  Alb  3.1<L>  /  TBili  0.5  /  DBili  x   /  AST  50<H>  /  ALT  70  /  AlkPhos  56      PT/INR - ( 2022 12:37 )   PT: 11.3 sec;   INR: 0.97 ratio         PTT - ( 2022 12:37 )  PTT:27.7 sec  Urinalysis Basic - ( 2022 15:25 )    Color: Yellow / Appearance: Clear / S.015 / pH: x  Gluc: x / Ketone: Negative  / Bili: Negative / Urobili: Negative mg/dL   Blood: x / Protein: 15 mg/dL / Nitrite: Negative   Leuk Esterase: Negative / RBC: x / WBC x   Sq Epi: x / Non Sq Epi: Occasional / Bacteria: Occasional          RADIOLOGY & ADDITIONAL TESTS:

## 2022-02-09 NOTE — ED ADULT TRIAGE NOTE - CHIEF COMPLAINT QUOTE
pt c/o  flu like  s/s since Friday and pain to b/l knee  hx htn currently on antibiotic for urinary infection started on Sunday

## 2022-02-09 NOTE — H&P ADULT - HISTORY OF PRESENT ILLNESS
62 yo female PMH HTN, HLD, arthritis presents with fever/chills associated with knee pain L>R and L ankle pain x 4 days. Also states she has been feeling dizzy and sob with exertion since onset. Denies chest pain, loc, known sick contacts, no prior blood clots, no history of covid. As per patient, had intraarticular injection - last may was last injection for his arthritis.      60 yo female PMH HTN, HLD, arthritis presents with fever/chills associated with knee pain L>R and L ankle pain x 4 days. Also states she has been feeling dizzy and sob with exertion since onset. Denies chest pain, loc, known sick contacts, no prior blood clots, no history of covid. As per patient, had intraarticular injection, last May was last injection for arthritis.      60 yo female PMH HTN, HLD, arthritis presents with fever/chills associated with knee pain L>R and L ankle pain x 4 days. Also states she has been feeling dizzy and sob with exertion since onset. Denies chest pain, loc, known sick contacts, no prior blood clots, no history of covid. As per patient, had intraarticular injection, last May was last injection for arthritis.  RA sat is 100 % on arrival.

## 2022-02-09 NOTE — ED PROVIDER NOTE - ATTENDING CONTRIBUTION TO CARE
Patient evaluated and seen with HERMINIO Rowe agree with above history and physical - pt examined and seen by me personally - findings as seen: Pt otherwise with SOB on minimal exertion - D-Dimer elevation noted - unable to perform CTA, will need VQ scan for r/o PE. Pt to be admitted for further care. Cellulitis of left leg noted - will place on doxycycline.

## 2022-02-10 ENCOUNTER — TRANSCRIPTION ENCOUNTER (OUTPATIENT)
Age: 62
End: 2022-02-10

## 2022-02-10 VITALS
HEART RATE: 81 BPM | RESPIRATION RATE: 18 BRPM | DIASTOLIC BLOOD PRESSURE: 65 MMHG | OXYGEN SATURATION: 100 % | SYSTOLIC BLOOD PRESSURE: 117 MMHG

## 2022-02-10 LAB
CHOLEST SERPL-MCNC: 181 MG/DL — SIGNIFICANT CHANGE UP
CULTURE RESULTS: SIGNIFICANT CHANGE UP
HCV AB S/CO SERPL IA: 0.12 S/CO — SIGNIFICANT CHANGE UP (ref 0–0.99)
HCV AB SERPL-IMP: SIGNIFICANT CHANGE UP
HDLC SERPL-MCNC: 39 MG/DL — LOW
LIPID PNL WITH DIRECT LDL SERPL: 120 MG/DL — HIGH
NON HDL CHOLESTEROL: 142 MG/DL — HIGH
NT-PROBNP SERPL-SCNC: 99 PG/ML — SIGNIFICANT CHANGE UP (ref 0–125)
SPECIMEN SOURCE: SIGNIFICANT CHANGE UP
TRIGL SERPL-MCNC: 111 MG/DL — SIGNIFICANT CHANGE UP

## 2022-02-10 PROCEDURE — 78582 LUNG VENTILAT&PERFUS IMAGING: CPT | Mod: 26

## 2022-02-10 PROCEDURE — 93306 TTE W/DOPPLER COMPLETE: CPT | Mod: 26

## 2022-02-10 PROCEDURE — 99239 HOSP IP/OBS DSCHRG MGMT >30: CPT

## 2022-02-10 RX ORDER — ATORVASTATIN CALCIUM 80 MG/1
1 TABLET, FILM COATED ORAL
Qty: 30 | Refills: 0
Start: 2022-02-10 | End: 2022-03-11

## 2022-02-10 RX ORDER — PANTOPRAZOLE SODIUM 20 MG/1
1 TABLET, DELAYED RELEASE ORAL
Qty: 30 | Refills: 0
Start: 2022-02-10 | End: 2022-03-11

## 2022-02-10 RX ORDER — AZTREONAM 2 G
1 VIAL (EA) INJECTION
Qty: 18 | Refills: 0
Start: 2022-02-10 | End: 2022-02-18

## 2022-02-10 RX ORDER — AMLODIPINE BESYLATE AND OLMESARTRAN MEDOXOMIL 10; 40 MG/1; MG/1
1 TABLET, FILM COATED ORAL
Qty: 0 | Refills: 0 | DISCHARGE

## 2022-02-10 RX ORDER — PANTOPRAZOLE SODIUM 20 MG/1
40 TABLET, DELAYED RELEASE ORAL DAILY
Refills: 0 | Status: DISCONTINUED | OUTPATIENT
Start: 2022-02-10 | End: 2022-02-10

## 2022-02-10 RX ORDER — INFLUENZA VIRUS VACCINE 15; 15; 15; 15 UG/.5ML; UG/.5ML; UG/.5ML; UG/.5ML
0.5 SUSPENSION INTRAMUSCULAR ONCE
Refills: 0 | Status: DISCONTINUED | OUTPATIENT
Start: 2022-02-10 | End: 2022-02-10

## 2022-02-10 RX ORDER — ROSUVASTATIN CALCIUM 5 MG/1
1 TABLET ORAL
Qty: 0 | Refills: 0 | DISCHARGE

## 2022-02-10 RX ORDER — AMLODIPINE BESYLATE AND OLMESARTRAN MEDOXOMIL 10; 40 MG/1; MG/1
1 TABLET, FILM COATED ORAL
Qty: 30 | Refills: 0
Start: 2022-02-10 | End: 2022-03-11

## 2022-02-10 RX ORDER — OXYCODONE AND ACETAMINOPHEN 5; 325 MG/1; MG/1
1 TABLET ORAL EVERY 6 HOURS
Refills: 0 | Status: DISCONTINUED | OUTPATIENT
Start: 2022-02-10 | End: 2022-02-10

## 2022-02-10 RX ADMIN — AMLODIPINE BESYLATE 10 MILLIGRAM(S): 2.5 TABLET ORAL at 06:22

## 2022-02-10 RX ADMIN — OXYCODONE AND ACETAMINOPHEN 1 TABLET(S): 5; 325 TABLET ORAL at 03:55

## 2022-02-10 RX ADMIN — OXYCODONE AND ACETAMINOPHEN 1 TABLET(S): 5; 325 TABLET ORAL at 17:37

## 2022-02-10 RX ADMIN — OXYCODONE AND ACETAMINOPHEN 1 TABLET(S): 5; 325 TABLET ORAL at 02:55

## 2022-02-10 RX ADMIN — PANTOPRAZOLE SODIUM 40 MILLIGRAM(S): 20 TABLET, DELAYED RELEASE ORAL at 12:02

## 2022-02-10 RX ADMIN — Medication 110 MILLIGRAM(S): at 06:35

## 2022-02-10 RX ADMIN — Medication 500 MILLIGRAM(S): at 17:38

## 2022-02-10 RX ADMIN — LISINOPRIL 40 MILLIGRAM(S): 2.5 TABLET ORAL at 06:22

## 2022-02-10 NOTE — PHYSICAL THERAPY INITIAL EVALUATION ADULT - PERTINENT HX OF CURRENT PROBLEM, REHAB EVAL
Pt is a 61-y/o, female, with c/o pain and edema to L knee and ankle and YOUNGER. Pt admitted to [facility] for cellulitis to L LE. Pt stated that pain to L knee has been ongoing and is being follow by ortho outpatient, however few days ago pain is so severe that pt went to ED. Pt now referred to PT for evaluation.

## 2022-02-10 NOTE — PHYSICAL THERAPY INITIAL EVALUATION ADULT - RANGE OF MOTION EXAMINATION, REHAB EVAL
L knee 10-80 deg flexion/bilateral upper extremity ROM was WFL (within functional limits)/bilateral lower extremity ROM was WFL (within functional limits)/deficits as listed below

## 2022-02-10 NOTE — DISCHARGE NOTE PROVIDER - HOSPITAL COURSE
62 yo female PMH HTN, HLD, arthritis presents with fever/chills associated with knee pain L>R and L ankle pain x 4 days. Also states she has been feeling dizzy and sob with exertion since onset. Denies chest pain, loc, known sick contacts, no prior blood clots, no history of covid. As per patient, had intraarticular injection, last May was last injection for arthritis.     Plan: Admit to medicine for SOB eval. Leg dopplers negative, has IV dye allergy. VQ scan for AM ordered. BNP and TTE ordered for   am to eval SOB. BNP level for AM.  D-dimer mild elevation at 362.     Will add IV Doxycycline for left ankle cellulitis.      Continue home meds of Norvasc, Zestril and Crestor.  If above eval WNL, likely discharge home in AM. 62 yo female PMH HTN, HLD, arthritis presents with fever/chills associated with knee pain L>R and L ankle pain x 4 days. Also states she has been feeling dizzy and sob with exertion since onset. Denies chest pain, loc, known sick contacts, no prior blood clots, no history of covid. As per patient, had intraarticular injection, last May was last injection for arthritis.     Plan: Admit to medicine for SOB eval now ruled out for DVT and PE. Leg dopplers negative, has IV dye allergy. TTE stable. BNP level neg.  D-dimer mild elevation at 362. V/Q scan negative.     Osteoarthritis w/ effusion of left knee. Pain control. Warn compress. Weight bearing exercises.     Left ankle cellulites will be transitioned to PO antibiotics for remainder of course.     Continue home meds of Norvasc, Zestril and Crestor.   Patient discharged home with outpatient F/U with PMD and outpatient PT.

## 2022-02-10 NOTE — DISCHARGE NOTE PROVIDER - NSDCMRMEDTOKEN_GEN_ALL_CORE_FT
amlodipine-olmesartan 10 mg-40 mg oral tablet: 1 tab(s) orally once a day  Crestor 10 mg oral tablet: 1 tab(s) orally once a day   amlodipine-olmesartan 10 mg-40 mg oral tablet: 1 tab(s) orally once a day  atorvastatin 40 mg oral tablet: 1 tab(s) orally once a day (at bedtime)  Bactrim  mg-160 mg oral tablet: 1 tab(s) orally 2 times a day   naproxen 500 mg oral tablet: 1 tab(s) orally every 12 hours, As Needed pain  pantoprazole 40 mg oral delayed release tablet: 1 tab(s) orally once a day

## 2022-02-10 NOTE — DISCHARGE NOTE PROVIDER - ATTENDING DISCHARGE PHYSICAL EXAMINATION:
GENERAL: NAD, seen in ER, comfortable, no CP, fevers or SOB   mild left ankle cellulitis.   HEAD:  atraumatic, normocephalic  EYES: sclera anicteric  ENMT: mucous membranes moist  NECK: supple, No JVD  CHEST/LUNG: clear to auscultation bilaterally; no rales, rhonchi, or wheezing b/l  HEART: normal S1, S2  ABDOMEN: BS+, soft, ND, NT   EXTREMITIES:  pulses palpable; no clubbing, cyanosis, or edema b/l LEs  NEURO: awake, alert, interactive; moves all extremities  SKIN: no rashes or lesions

## 2022-02-10 NOTE — CHART NOTE - NSCHARTNOTEFT_GEN_A_CORE
Patient was admitted to [facility] from 2/11/22 to 2/12/22 and may return to work after clearance by her outpatient primary care provider.

## 2022-02-10 NOTE — DISCHARGE NOTE NURSING/CASE MANAGEMENT/SOCIAL WORK - NSDCPEFALRISK_GEN_ALL_CORE
For information on Fall & Injury Prevention, visit: [email or web address] OR  [email or web address] OR  https://www.cdc.gov/steadi/patient.html

## 2022-02-10 NOTE — PHYSICAL THERAPY INITIAL EVALUATION ADULT - ADDITIONAL COMMENTS
Lives in a house with family; +6 steps to enter with L rail. Pt works full time (requires long walks/physical work). Independent in ADLs, transfers and ambulates with no device.

## 2022-02-10 NOTE — DISCHARGE NOTE NURSING/CASE MANAGEMENT/SOCIAL WORK - PATIENT PORTAL LINK FT
You can access the FollowMyHealth Patient Portal offered by [facility] by registering at the following website: [email or web address]. By joining [employer]’s FollowMyHealth portal, you will also be able to view your health information using other applications (apps) compatible with our system.

## 2022-02-10 NOTE — PATIENT PROFILE ADULT - FALL HARM RISK - HARM RISK INTERVENTIONS
Communicate Risk of Fall with Harm to all staff/Monitor gait and stability/Provide patient with walking aids - walker, cane, crutches/Reinforce activity limits and safety measures with patient and family/Review medications for side effects contributing to fall risk/Sit up slowly, dangle for a short time, stand at bedside before walking/Tailored Fall Risk Interventions/Toileting schedule using arm’s reach rule for commode and bathroom/Visual Cue: Yellow wristband and red socks/Bed in lowest position, wheels locked, appropriate side rails in place/Call bell, personal items and telephone in reach/Instruct patient to call for assistance before getting out of bed or chair/Non-slip footwear when patient is out of bed/[place] to call system/Physically safe environment - no spills, clutter or unnecessary equipment/Purposeful Proactive Rounding/Room/bathroom lighting operational, light cord in reach

## 2022-02-10 NOTE — DISCHARGE NOTE PROVIDER - NSDCCPCAREPLAN_GEN_ALL_CORE_FT
PRINCIPAL DISCHARGE DIAGNOSIS  Diagnosis: Dyspnea on exertion  Assessment and Plan of Treatment:       SECONDARY DISCHARGE DIAGNOSES  Diagnosis: Cellulitis of left leg  Assessment and Plan of Treatment:

## 2022-02-11 RX ORDER — ATORVASTATIN CALCIUM 80 MG/1
1 TABLET, FILM COATED ORAL
Qty: 30 | Refills: 0
Start: 2022-02-11 | End: 2022-03-12

## 2022-02-14 DIAGNOSIS — I10 ESSENTIAL (PRIMARY) HYPERTENSION: ICD-10-CM

## 2022-02-14 DIAGNOSIS — R06.02 SHORTNESS OF BREATH: ICD-10-CM

## 2022-02-14 DIAGNOSIS — Z91.041 RADIOGRAPHIC DYE ALLERGY STATUS: ICD-10-CM

## 2022-02-14 DIAGNOSIS — M25.462 EFFUSION, LEFT KNEE: ICD-10-CM

## 2022-02-14 DIAGNOSIS — Z91.040 LATEX ALLERGY STATUS: ICD-10-CM

## 2022-02-14 DIAGNOSIS — E78.00 PURE HYPERCHOLESTEROLEMIA, UNSPECIFIED: ICD-10-CM

## 2022-02-14 DIAGNOSIS — L03.116 CELLULITIS OF LEFT LOWER LIMB: ICD-10-CM

## 2022-02-14 DIAGNOSIS — M17.0 BILATERAL PRIMARY OSTEOARTHRITIS OF KNEE: ICD-10-CM

## 2022-02-14 LAB
CULTURE RESULTS: SIGNIFICANT CHANGE UP
CULTURE RESULTS: SIGNIFICANT CHANGE UP
SPECIMEN SOURCE: SIGNIFICANT CHANGE UP
SPECIMEN SOURCE: SIGNIFICANT CHANGE UP

## 2022-04-04 ENCOUNTER — OUTPATIENT (OUTPATIENT)
Dept: OUTPATIENT SERVICES | Facility: HOSPITAL | Age: 62
LOS: 1 days | End: 2022-04-04
Payer: COMMERCIAL

## 2022-04-04 ENCOUNTER — RESULT REVIEW (OUTPATIENT)
Age: 62
End: 2022-04-04

## 2022-04-04 ENCOUNTER — APPOINTMENT (OUTPATIENT)
Dept: MRI IMAGING | Facility: CLINIC | Age: 62
End: 2022-04-04
Payer: COMMERCIAL

## 2022-04-04 DIAGNOSIS — Z00.8 ENCOUNTER FOR OTHER GENERAL EXAMINATION: ICD-10-CM

## 2022-04-04 PROCEDURE — 73721 MRI JNT OF LWR EXTRE W/O DYE: CPT | Mod: 26,LT

## 2022-04-04 PROCEDURE — 73721 MRI JNT OF LWR EXTRE W/O DYE: CPT

## 2022-04-09 ENCOUNTER — APPOINTMENT (OUTPATIENT)
Dept: MRI IMAGING | Facility: IMAGING CENTER | Age: 62
End: 2022-04-09

## 2025-07-02 NOTE — ED ADULT NURSE NOTE - EXTENSIONS OF SELF_ADULT
Airway  Date/Time: 7/2/2025 9:05 AM  Reason: elective    Airway not difficult    Staffing  Performed: attending   Authorized by: Tyler Mares MD    Performed by: CATHIE Nicole  Patient location during procedure: OR    Patient Condition  Indications for airway management: anesthesia  Patient position: sniffing  MILS maintained throughout    Final Airway Details   Preoxygenated: yes  Final airway type: supraglottic airway  Successful airway: classic  Size: 4   Ventilation between attempts: none  Number of attempts at approach: 1  Number of other approaches attempted: 0          
None